# Patient Record
Sex: MALE | Race: WHITE | Employment: FULL TIME | URBAN - METROPOLITAN AREA
[De-identification: names, ages, dates, MRNs, and addresses within clinical notes are randomized per-mention and may not be internally consistent; named-entity substitution may affect disease eponyms.]

---

## 2022-09-21 ENCOUNTER — HOSPITAL ENCOUNTER (OUTPATIENT)
Dept: PHYSICAL THERAPY | Age: 47
Discharge: HOME | End: 2022-09-21
Payer: OTHER GOVERNMENT

## 2022-09-21 PROCEDURE — 97162 PT EVAL MOD COMPLEX 30 MIN: CPT

## 2022-09-21 PROCEDURE — 97112 NEUROMUSCULAR REEDUCATION: CPT

## 2022-09-21 NOTE — PROGRESS NOTES
[address] [place] [address] PHYSICAL THERAPY AT [facility]. [place], [address] - Phone: [phone]  Fax: [phone] / [phone] [facility]  Patient Name: Susy France : 1975   Medical   Diagnosis: Vertigo [R42] Treatment Diagnosis: Vertigo; imbalance   Onset Date: ; but progressive worsening over past few years     Referral Source: Umair Davis MD [facility]): 2022   Prior Hospitalization: See medical history Provider #: [id number]   Prior Level of Function: Long history of dizziness and imbalance   Comorbidities: Allergies, back pain, GI disease, migraines,    Medications: Verified on Patient Summary List     The Plan of Care and following information is based on the information from the initial evaluation.     ========================================================================  Assessment / key information:   Susy France is a 50 y.o.  yo male with Dx of Vertigo [R42] and imbalance. He reports initial onset in  which occurred following Anthrax vaccination. Initial symptoms included \"room spinning\" type vertigo and imbalance. Symptoms subsided after a few years. In , he developed migraines which initially occurred 3-4x/week and were triggered primarily from stress. Since he retired from [employer], frequency has decreased to 2-3x/month. Mr. Ila Robles presents to PT with current c/o \"feeling off\" and imbalance which occurs with quick movements of the body or head. Imbalance is worse in the dark and when he has a migraine. He has seen multiple specialists to include neurology, ENT, Otolaryngology without known diagnosis to explaint the sense of \"being off\" or imbalance.   Objectively, the patient demonstrates  the following:  Balance: Decreased static balance: SLS: R= 15/15 sec, L= 15/15 sec (both with noted sway) diminished ambulatory balance assessed via Functional Gait Assessment (FGA = 27/30 points)   Dizziness with ADLs: Dizziness Handicap Inventory (DHI = 48 points). Oculo-motor tests are WNLs except for abnormal Smooth Pursuit, Convergence, and difficulty noted with performance of VOR. Modified Motion Sensitivity Test (MMST): Score= 21.4 (Moderate)   BPPV Assessment: Hallpike-Daniel (right):neg; Hallpike-Daniel (left)neg; Roll Test (right):neg; Roll Test (left): neg. Pt will benefit from PT/Vestibular rehab to address these deficits, improve functional independence and reduce dizziness and imbalance with normal daily activities. Thank you for this referral.   =======================================================================  Eval Complexity: History MEDIUM  Complexity : 1-2 comorbidities / personal factors will impact the outcome/ POC ;  Examination  HIGH Complexity : 4+ Standardized tests and measures addressing body structure, function, activity limitation and / or participation in recreation ; Presentation MEDIUM Complexity : Evolving with changing characteristics ; Decision Making Other outcome measures DHI  MEDIUM; Overall Complexity MEDIUM  Problem List: impaired gait/ balance, decrease ADL/ functional abilitiies, decrease activity tolerance and decrease transfer abilities   Treatment Plan may include any combination of the following: Therapeutic exercise, Therapeutic activities, Neuromuscular re-education, Gait/balance training and Patient education  Patient / Family readiness to learn indicated by: asking questions, trying to perform skills and interest  Persons(s) to be included in education: patient (P)  Barriers to Learning/Limitations: None  Measures taken, if barriers to learning:    Patient Goal (s): Improved balance   Self reported health status: excellent  Rehabilitation Potential: excellent  Short Term Goals: To be accomplished in 4-6  treatments:  1. Patient will report at least 25% reduction of symptoms with ADLs.   2. Patient will be independent and complaint with HEP TID to reduce imbalance and dizziness with ADLs. 3. MMST will improve to less than or equal to 20 points to demonstrate reduction of dizziness and imbalance with ADLs. Long Term Goals: To be accomplished in 10-12 treatments:  1. Patient will report at least 50% reduction of symptoms with ADLs. 2. Patient will be independent with self progression of HEP and demonstrate willingness to continue HEP after D/C to maximize/maintain gains in functional mobility. 3. MMST will improve to less than or equal to 15 points to demonstrate significant reduction of dizziness and imbalance with ADLs. 4. FGA will improve to greater then or equal to 29/30 points to demonstrate a significant improvement in ambulatory balance. Frequency / Duration:   Patient to be seen  2  times per week for 4-6  weeks:  Patient / Caregiver education and instruction: self care, activity modification, and exercises    Therapist Signature: Effie Mc PT Date: 8/08/7098   Certification Period:  Time: 2:31 PM   =====================================================================  I certify that the above Physical Therapy Services are being furnished while the patient is under my care. I agree with the treatment plan and certify that this therapy is necessary. Physician Signature:        Date:       Time:       Ernestine Hare MD  Please sign and return to [facility] or you may fax the signed copy to [phone]. Thank you.

## 2022-09-21 NOTE — PROGRESS NOTES
PHYSICAL THERAPY - DAILY TREATMENT NOTE     Patient Name: Roseann Doherty        Date: 2022  : 1975   YES Patient  Verified  Visit #:   1     Insurance: Payor:  / Plan: Andrea Macias 72 / Product Type:  /      In time: 1250 Out time: 150   Total Treatment Time: 60     Medicare/[employer] Time Tracking (below)   Total Timed Codes (min):   1:1 Treatment Time:       TREATMENT AREA =  Vertigo [R42]    SUBJECTIVE    Pain Level (on 0 to 10 scale):  0  / 10   Medication Changes/New allergies or changes in medical history, any new surgeries or procedures?     NO    If yes, update Summary List   Subjective Functional Status/Changes:  []  No changes reported     See eval /POC         OBJECTIVE  Modalities Rationale:     decrease pain to improve patient's ability to return to PLOF      min [] Estim, type/location:                                      []  att     []  unatt     []  w/US     []  w/ice    []  w/heat    min []  Mechanical Traction: type/lbs                   []  pro   []  sup   []  int   []  cont    []  before manual    []  after manual    min []  Ultrasound, settings/location:      min []  Iontophoresis w/ dexamethasone, location:                                               []  take home patch       []  in clinic    min []  Ice     []  Heat    location/position:     min []  Vasopneumatic Device, press/temp:     min []  Other:    [] Skin assessment post-treatment (if applicable):    []  intact    []  redness- no adverse reaction     []redness - adverse reaction:       min Therapeutic Exercise:  [x]  See flow sheet   Rationale:      increase ROM and increase strength to improve the patients ability to return to PLOF      min Manual Therapy: Technique:      [] S/DTM []IASTM []PROM [] Passive Stretching   []manual TPR    []Jt manipulation:Gr I [] II []  III [] IV[]  []REIL with manual OP  Treatment Area:     Rationale:      decrease pain, increase ROM, increase tissue extensibility and decrease trigger points to improve patient's ability to return to PLOF    15 min Neuromuscular Re-ed: [x]  See flow sheet   Rationale:      improve coordination, improve balance, increase proprioception and dec dizziness to improve the patients ability to return to PLOF        min Self Care:    Rationale:    increase ROM, increase strength and improve coordination to improve the patients ability to return to PLOF    Billed With/As:   [] TE   [] TA   [] Neuro   [] Self Care Patient Education: [x] Review HEP    [] Progressed/Changed HEP based on:   [] positioning   [] body mechanics   [] transfers   [] heat/ice application    [] other:        Other Objective/Functional Measures:    See eval/ POC     Post Treatment Pain Level (on 0 to 10) scale:   -  / 10     ASSESSMENT    X  See POC     PLAN    [x]  Upgrade activities as tolerated {YES) Continue plan of care   []  Discharge due to :    []  Other:      Therapist: Archie Martin PT    Date: 9/21/2022 Time: 2:30 PM     Future Appointments   Date Time Provider Emani Posada   9/26/2022  8:00 AM Chetan Calero, PT [facility]   9/30/2022  8:00 AM Chetan Calero, PT [facility]   10/5/2022  8:00 AM Chetan Calero, PT [facility]   10/7/2022  8:40 AM Chetan Calero, PT [facility]   10/13/2022 10:25 AM Chetan Calero, PT [facility]   10/17/2022  3:05 PM Chetan Calero, PT [facility]   10/19/2022  8:00 AM Chetan Calero, PT [facility]

## 2022-09-26 ENCOUNTER — APPOINTMENT (OUTPATIENT)
Dept: PHYSICAL THERAPY | Age: 47
End: 2022-09-26
Payer: OTHER GOVERNMENT

## 2022-09-30 ENCOUNTER — HOSPITAL ENCOUNTER (OUTPATIENT)
Dept: PHYSICAL THERAPY | Age: 47
Discharge: HOME | End: 2022-09-30
Payer: OTHER GOVERNMENT

## 2022-09-30 PROCEDURE — 97112 NEUROMUSCULAR REEDUCATION: CPT

## 2022-09-30 NOTE — PROGRESS NOTES
PHYSICAL THERAPY - DAILY TREATMENT NOTE     Patient Name: Charlene Manzanares        Date: 2022  : 1975   YES Patient  Verified  Visit #:   2   of     Insurance: Payor: BRITTNEY / Plan: Andrea Macias 72 / Product Type:  /      In time: 800 Out time: 840   Total Treatment Time: 40     Medicare/[facility] Time Tracking (below)   Total Timed Codes (min):   1:1 Treatment Time:       TREATMENT AREA =  Vertigo [R42]    SUBJECTIVE    Pain Level (on 0 to 10 scale):  4- intensity  / 10   Medication Changes/New allergies or changes in medical history, any new surgeries or procedures? NO    If yes, update Summary List   Subjective Functional Status/Changes:  []  No changes reported     Doing well with HEP--tried to do 3x/day    Had some Sx rolling in bed         OBJECTIVE      40 min Neuromuscular Re-ed: [x]  See flow sheet   Rationale:      improve coordination, improve balance, increase proprioception, and dec dizziness tp perform ADLs/ recreational activity          Billed With/As:   [] TE   [] TA   [] Neuro   [] Self Care Patient Education: [x] Review HEP    [] Progressed/Changed HEP based on:   [] positioning   [] body mechanics   [] transfers   [] heat/ice application    [] other:        Other Objective/Functional Measures: Added VVE  Added metronome to VSE    Initiated static balance activity balance--established baselines. Provided written instruct for HEP   Post Treatment Pain Level (on 0 to 10) scale:   4  / 10     ASSESSMENT    Assessment/Changes in Function:     Progressed HEP/ pt reports compliance     []  See Progress Note/Recertification   Patient will continue to benefit from skilled PT services to modify and progress therapeutic interventions, address functional mobility deficits, and address imbalance/dizziness to attain remaining goals.       Progress toward goals / Updated goals:    Good Progress to    [x] STG    [x] LTG  2 as shown by pt report/ progression of HEP       [] See Progress Note/Recertification    PLAN    [x]  Upgrade activities as tolerated {YES) Continue plan of care   []  Discharge due to :    []  Other:      Therapist: Tanvir Jackson PT    Date: 9/30/2022 Time: 7:57 AM     Future Appointments   Date Time Provider Emani Posada   9/30/2022  8:00 AM Dasia Disla, PT [facility]   10/5/2022  8:00 AM Dasia Disla PT [facility]   10/7/2022  8:40 AM Dasia Disla PT [facility]   10/13/2022 10:25 AM Dasia Disla PT [facility]   10/17/2022  3:05 PM Dasia Disla PT [facility]   10/19/2022  8:00 AM Dasia Disla PT [facility]

## 2022-10-05 ENCOUNTER — HOSPITAL ENCOUNTER (OUTPATIENT)
Dept: PHYSICAL THERAPY | Age: 47
Discharge: HOME | End: 2022-10-05
Payer: OTHER GOVERNMENT

## 2022-10-05 PROCEDURE — 97112 NEUROMUSCULAR REEDUCATION: CPT

## 2022-10-05 NOTE — PROGRESS NOTES
PHYSICAL THERAPY - DAILY TREATMENT NOTE     Patient Name: Jennifer Naik        Date: 10/5/2022  : 1975   YES Patient  Verified  Visit #:   3   of     Insurance: Payor:  / Plan: Nevaeh Tamez / Product Type:  /      In time: 382 Out time: 050   Total Treatment Time: 40     Medicare/[facility] Time Tracking (below)   Total Timed Codes (min):   1:1 Treatment Time:       TREATMENT AREA =  Vertigo [R42]    SUBJECTIVE    Pain Level (on 0 to 10 scale):  2- intensity  / 10   Medication Changes/New allergies or changes in medical history, any new surgeries or procedures? NO    If yes, update Summary List   Subjective Functional Status/Changes:  []  No changes reported     Sx not bad. Took few days to get used to new ex's. Now doing better with some balance ex's         OBJECTIVE      40 min Neuromuscular Re-ed: [x]  See flow sheet   Rationale:      improve coordination, improve balance, increase proprioception, and dec dizziness  to improve the patients ability to perform ADLs with min Sx         Billed With/As:   [] TE   [] TA   [] Neuro   [] Self Care Patient Education: [x] Review HEP    [] Progressed/Changed HEP based on:   [] positioning   [] body mechanics   [] transfers   [] heat/ice application    [] other:        Other Objective/Functional Measures: Added balance on rail--significantly challenged with EC    Added metronome to VVI    Added busy background to VSE/ VVI    Initiated dynamic gait--see flow sheet   Post Treatment Pain Level (on 0 to 10) scale:   4  / 10     ASSESSMENT    Assessment/Changes in Function:     Progressing balance--static and dynamic     []  See Progress Note/Recertification   Patient will continue to benefit from skilled PT services to modify and progress therapeutic interventions, address functional mobility deficits, and address imbalance/dizziness to attain remaining goals.       Progress toward goals / Updated goals:    Good Progress to    [x] STG    [] LTG  1 as shown by pt report of improvement    Good compliance with HEP       []  See Progress Note/Recertification    PLAN    [x]  Upgrade activities as tolerated {YES) Continue plan of care   []  Discharge due to :    []  Other:      Therapist: Bg Santillan PT    Date: 10/5/2022 Time: 7:56 AM     Future Appointments   Date Time Provider Emani Posada   10/5/2022  8:00 AM Veronica Button, PT [facility]   10/7/2022  8:40 AM Veronica Button, PT [facility]   10/13/2022 10:25 AM Veronica Button, PT [facility]   10/24/2022  8:10 AM Darlys Marking, PTA [facility]   10/26/2022  8:10 AM Darlys Marking, PTA [facility]

## 2022-10-07 ENCOUNTER — HOSPITAL ENCOUNTER (OUTPATIENT)
Dept: PHYSICAL THERAPY | Age: 47
Discharge: HOME | End: 2022-10-07
Payer: OTHER GOVERNMENT

## 2022-10-07 PROCEDURE — 97112 NEUROMUSCULAR REEDUCATION: CPT

## 2022-10-07 PROCEDURE — 97530 THERAPEUTIC ACTIVITIES: CPT

## 2022-10-07 NOTE — PROGRESS NOTES
PHYSICAL THERAPY - DAILY TREATMENT NOTE     Patient Name: Jose Roberto Teran        Date: 10/7/2022  : 1975   YES Patient  Verified  Visit #:   4   of     Insurance: Payor: BRITTNEY / Plan: Andrea Macias 74 / Product Type:  /      In time: 089 Out time: 920   Total Treatment Time: 45     Medicare/[facility] Time Tracking (below)   Total Timed Codes (min):   1:1 Treatment Time:       TREATMENT AREA =  Vertigo [R42]    SUBJECTIVE    Pain Level (on 0 to 10 scale):  2  / 10   Medication Changes/New allergies or changes in medical history, any new surgeries or procedures? NO    If yes, update Summary List   Subjective Functional Status/Changes:  []  No changes reported     Working on ex's more aggressively. Sx seem worse in morning.            OBJECTIVE      25 min Neuromuscular Re-ed: [x]  See flow sheet   Rationale:      improve coordination, improve balance, increase proprioception, and dec dizziness  to improve the patients ability to ablity to perform ADLs/ work with min Sx     20 min Therapeutic Activity: [x]  See flow sheet   Rationale:      improve coordination, improve balance, and dec dizziness  to improve the patients ability to perform ADLs with min Sx       Billed With/As:   [] TE   [] TA   [] Neuro   [] Self Care Patient Education: [x] Review HEP    [] Progressed/Changed HEP based on:   [] positioning   [] body mechanics   [] transfers   [] heat/ice application    [] other:        Other Objective/Functional Measures:    Increased speed with VSE/ VVI  Advanced footing with static balance as per flow sheet   Post Treatment Pain Level (on 0 to 10) scale:   2  / 10     ASSESSMENT    Assessment/Changes in Function:     Improving balance/ dec dizziness with ADLs     []  See Progress Note/Recertification   Patient will continue to benefit from skilled PT services to modify and progress therapeutic interventions, address functional mobility deficits, and address imbalance/dizziness to attain remaining goals.       Progress toward goals / Updated goals:    Good Progress to    [] STG    [x] LTG  1 as shown by pt report/ demo       []  See Progress Note/Recertification    PLAN    [x]  Upgrade activities as tolerated {YES) Continue plan of care   []  Discharge due to :    []  Other:      Therapist: Shun Farley, PT    Date: 10/7/2022 Time: 8:34 AM     Future Appointments   Date Time Provider Emani Posada   10/7/2022  8:40 AM Clare Javed, PT [facility]   10/13/2022 10:25 AM Clare Javed PT [facility]   10/24/2022  8:10 AM Ketty Jesus PTA [facility]   10/26/2022  8:10 AM Ketty Jesus PTA [facility]

## 2022-10-13 ENCOUNTER — HOSPITAL ENCOUNTER (OUTPATIENT)
Dept: PHYSICAL THERAPY | Age: 47
Discharge: HOME | End: 2022-10-13
Payer: OTHER GOVERNMENT

## 2022-10-13 PROCEDURE — 97530 THERAPEUTIC ACTIVITIES: CPT

## 2022-10-13 PROCEDURE — 97112 NEUROMUSCULAR REEDUCATION: CPT

## 2022-10-13 NOTE — PROGRESS NOTES
PHYSICAL THERAPY - DAILY TREATMENT NOTE     Patient Name: Jenny Cullen        Date: 10/13/2022  : 1975   YES Patient  Verified  Visit #:   5   of     Insurance: Payor: BRITTNEY / Plan: Andrea Macias 74 / Product Type:  /      In time: 120 Out time: 3753   Total Treatment Time: 40     Medicare/[employer] Time Tracking (below)   Total Timed Codes (min):   1:1 Treatment Time:       TREATMENT AREA =  Vertigo [R42]    SUBJECTIVE    Pain Level (on 0 to 10 scale):  6  / 10   Medication Changes/New allergies or changes in medical history, any new surgeries or procedures? NO    If yes, update Summary List   Subjective Functional Status/Changes:  []  No changes reported     Tail end of a migraine. Was in [place] all weekend.   Then not eating as scheduled for procedure tomorrow         OBJECTIVE    25 min Neuromuscular Re-ed: [x]  See flow sheet   Rationale:      improve coordination, improve balance, increase proprioception, and dec dizziness  to improve the patients ability to ablity to perform ADLs/ work with min Sx      20 min Therapeutic Activity: [x]  See flow sheet   Rationale:      improve coordination, improve balance, and dec dizziness  to improve the patients ability to perform ADLs with min Sx          Billed With/As:   [] TE   [] TA   [] Neuro   [] Self Care Patient Education: [x] Review HEP    [] Progressed/Changed HEP based on:   [] positioning   [] body mechanics   [] transfers   [] heat/ice application    [] other:        Other Objective/Functional Measures:    Progressed compliant surface to VANESSA; added BOSU activity   Post Treatment Pain Level (on 0 to 10) scale:    10     ASSESSMENT    Assessment/Changes in Function:     Progressing balance; still challenged with EC     []  See Progress Note/Recertification   Patient will continue to benefit from skilled PT services to modify and progress therapeutic interventions, address functional mobility deficits, and address imbalance/dizziness to attain remaining goals.       Progress toward goals / Updated goals:    Good Progress to    [] STG    [x] LTG  4 as shown by improving balance       []  See Progress Note/Recertification    PLAN    [x]  Upgrade activities as tolerated {YES) Continue plan of care   []  Discharge due to :    []  Other:      Therapist: Sherman Sterling, PT    Date: 10/13/2022 Time: 10:33 AM     Future Appointments   Date Time Provider Emani Posada   10/24/2022  8:10 AM Nancie Farm, PTA [facility]   10/26/2022  8:10 AM Nancie Farm, PTA [facility]

## 2022-10-17 ENCOUNTER — APPOINTMENT (OUTPATIENT)
Dept: PHYSICAL THERAPY | Age: 47
End: 2022-10-17
Payer: OTHER GOVERNMENT

## 2022-10-19 ENCOUNTER — APPOINTMENT (OUTPATIENT)
Dept: PHYSICAL THERAPY | Age: 47
End: 2022-10-19
Payer: OTHER GOVERNMENT

## 2022-10-24 ENCOUNTER — HOSPITAL ENCOUNTER (OUTPATIENT)
Dept: PHYSICAL THERAPY | Age: 47
Discharge: HOME | End: 2022-10-24
Payer: OTHER GOVERNMENT

## 2022-10-24 PROCEDURE — 97112 NEUROMUSCULAR REEDUCATION: CPT

## 2022-10-24 NOTE — PROGRESS NOTES
PHYSICAL THERAPY - DAILY TREATMENT NOTE     Patient Name: Angie Filter        Date: 10/24/2022  : 1975   YES Patient  Verified  Visit #:   6     Insurance: Payor: BRITTNEY / Plan: Andrea Macias 74 / Product Type:  /      In time: 8:17 am Out time: 8:58 am   Total Treatment Time: 41     Medicare/[facility] Time Tracking (below)   Total Timed Codes (min):  - 1:1 Treatment Time:  -     TREATMENT AREA =  Vertigo [R42]    SUBJECTIVE    Pain Level (on 0 to 10 scale):  0  / 10   Medication Changes/New allergies or changes in medical history, any new surgeries or procedures? NO    If yes, update Summary List   Subjective Functional Status/Changes:  []  No changes reported     Pt reports ~ 50% overall improvement in sx. Decreasing LOB with walking dog in am, going up and down stairs-he does not have to use the rail as much.   Just traveled by from [place]          OBJECTIVE  Modalities Rationale:  n/a         41 min Neuromuscular Re-ed: [x]  See flow sheet   Rationale:      increase ROM, increase strength, improve coordination, improve balance, and increase proprioception to improve the patients ability to decrease imbalance in ADLs         Billed With/As:   [] TE   [] TA   [] Neuro   [] Self Care Patient Education: [x] Review HEP    [] Progressed/Changed HEP based on:   [] positioning   [] body mechanics   [] transfers   [] heat/ice application    [] other:        Other Objective/Functional Measures:    Review HEP     Post Treatment Pain Level (on 0 to 10) scale:   0  / 10     ASSESSMENT    Assessment/Changes in Function:     Pt met STG for % improvement in sx  Emphasized consistency in VSE/VVI 3x per day as tolerated     []  See Progress Note/Recertification   Patient will continue to benefit from skilled PT services to modify and progress therapeutic interventions, address functional mobility deficits, address ROM deficits, address strength deficits, analyze and address soft tissue restrictions, analyze and cue movement patterns, assess and modify postural abnormalities, address imbalance/dizziness, and instruct in home and community integration to attain remaining goals.       Progress toward goals / Updated goals:    Good Progress to    [x] STG    [] LTG  1 as shown by performing HEP, decreasing sx in ADLs       PLAN    [x]  Upgrade activities as tolerated YES Continue plan of care   []  Discharge due to :    []  Other:      Therapist: Sudhir Hunt PTA    Date: 10/24/2022 Time: 8:58 AM     Future Appointments   Date Time Provider Emani Posada   11/2/2022 12:10 PM Mohit Gonzalez PTA [facility]

## 2022-10-26 ENCOUNTER — APPOINTMENT (OUTPATIENT)
Dept: PHYSICAL THERAPY | Age: 47
End: 2022-10-26
Payer: OTHER GOVERNMENT

## 2022-11-02 ENCOUNTER — HOSPITAL ENCOUNTER (OUTPATIENT)
Dept: PHYSICAL THERAPY | Age: 47
Discharge: HOME | End: 2022-11-02
Payer: OTHER GOVERNMENT

## 2022-11-02 PROCEDURE — 97112 NEUROMUSCULAR REEDUCATION: CPT

## 2022-11-02 PROCEDURE — 97530 THERAPEUTIC ACTIVITIES: CPT

## 2022-11-02 NOTE — PROGRESS NOTES
PHYSICAL THERAPY - DAILY TREATMENT NOTE     Patient Name: Gisel Salomon        Date: 2022  : 1975   YES Patient  Verified  Visit #:   7   of   8  Insurance: Payor: BRITTNEY / Plan: Andrea Macias 72 / Product Type:  /      In time: 12:30 Out time: 1:07   Total Treatment Time: 37     Medicare/BCBS Time Tracking (below)   Total Timed Codes (min):  - 1:1 Treatment Time:  -     TREATMENT AREA =  Vertigo [R42]    SUBJECTIVE    Pain Level (on 0 to 10 scale):  2 to 1/ 10-mild sx   Medication Changes/New allergies or changes in medical history, any new surgeries or procedures?     NO    If yes, update Summary List   Subjective Functional Status/Changes:  []  No changes reported     Pt reports ~ 50% overall improvement in sx  Walking @ night , going to rest room did not have to lean against wall going up the stairs, min imbalance with KEELEY salter       OBJECTIVE  Modalities Rationale:   n/a          8 min Therapeutic Activity: [x]  Review Vestibular taper and D/C instructions   Rationale:      increase ROM, improve coordination, improve balance, and increase proprioception to improve the patients ability to decrease imbalance in ADLs     29 min Neuromuscular Re-ed: [x]  See flow sheet   Rationale:      increase strength, improve coordination, and improve balance to improve the patients ability to decrease imbalance in ADLs          Billed With/As:   [] TE   [] TA   [] Neuro   [] Self Care Patient Education: [x] Review HEP    [] Progressed/Changed HEP based on:   [] positioning   [] body mechanics   [] transfers   [] heat/ice application    [] other:        Other Objective/Functional Measures:    Review D/C HEP and vestibular taper     Post Treatment Pain Level (on 0 to 10) scale:   0  / 10     ASSESSMENT    Assessment/Changes in Function:     See progress note     []  See Progress Note/Recertification   Patient will continue to benefit from skilled PT services to modify and progress therapeutic interventions, analyze and modify body mechanics/ergonomics, assess and modify postural abnormalities, address imbalance/dizziness, and instruct in home and community integration to attain remaining goals. Progress toward goals / Updated goals:  See progress note       PLAN    [x]  Upgrade activities as tolerated YES Continue plan of care   []  Discharge due to :    []  Other:      Therapist: Nandini Almanzar PTA    Date: 11/2/2022 Time: 12:07 PM   No future appointments.

## 2022-11-02 NOTE — PROGRESS NOTES
[facility] PHYSICAL THERAPY AT [address], [address] - Phone: [phone] Fax: [phone]  Discharge NOTE  Patient Name: Jenna Gonzalez : 1975   Treatment/Medical Diagnosis: Vertigo [R42]   Referral Source: Lona Olson MD     Date of Initial Visit: 2022 Attended Visits: 7 Missed Visits: 0     SUMMARY OF TREATMENT  Physical Therapy Treatment has consisted of Neuromuscular re education for static and dynamic balance, Therapeutic Activity, HEP, and pt instructed in vestibular taper. CURRENT STATUS  Mr. Summer Flower has made excellent progress in physical therapy. He reports ~ 50% overall improvement in sx  Functional improvements include: Walking @ night , he does not have to lean against wall while going up the stairs for balance , min imbalance with KB lunges    Goal/Measure of Progress Goal Met? 1. Patient will report at least 25% reduction of symptoms with ADLs. Status at last Eval: na Current Status: 50% overall improvement. yes   2. Patient will be independent and complaint with HEP TID to reduce imbalance and dizziness with ADLs. Status at last Eval: dependent Current Status: Pt I in HEP and Vestibular taper yes   3. MMST will improve to less than or equal to 20 points to demonstrate reduction of dizziness and imbalance with ADLs. Status at last Eval: 21.4 Current Status: .71 yes       RECOMMENDATIONS  Discharge to Vestibular taper and D/C HEP. If you have any questions/comments please contact us directly at  [phone]  Thank you for allowing us to assist in the care of your patient.   LPTA Signature: Yanira Contreras PTA  Date: 2022   PT Signature:  Time: 7:54 AM   NOTE TO PHYSICIAN:  PLEASE COMPLETE THE ORDERS BELOW AND FAX TO   [facility]: [phone]  If you are unable to process this request in 24 hours please contact our office:  [phone].    ___ I have read the above report and request that my patient continue as recommended.   ___ I have read the above report and request that my patient continue therapy with the following changes/special instructions:_________________________________________________________   ___ I have read the above report and request that my patient be discharged from therapy.      Physician Signature:        Date:       Time: